# Patient Record
Sex: MALE | Race: OTHER | NOT HISPANIC OR LATINO
[De-identification: names, ages, dates, MRNs, and addresses within clinical notes are randomized per-mention and may not be internally consistent; named-entity substitution may affect disease eponyms.]

---

## 2020-03-14 ENCOUNTER — TRANSCRIPTION ENCOUNTER (OUTPATIENT)
Age: 25
End: 2020-03-14

## 2020-04-11 ENCOUNTER — TRANSCRIPTION ENCOUNTER (OUTPATIENT)
Age: 25
End: 2020-04-11

## 2020-05-01 ENCOUNTER — EMERGENCY (EMERGENCY)
Facility: HOSPITAL | Age: 25
LOS: 1 days | Discharge: HOME | End: 2020-05-01
Attending: EMERGENCY MEDICINE
Payer: COMMERCIAL

## 2020-05-01 VITALS
HEIGHT: 72 IN | TEMPERATURE: 98 F | OXYGEN SATURATION: 100 % | WEIGHT: 169.98 LBS | DIASTOLIC BLOOD PRESSURE: 72 MMHG | SYSTOLIC BLOOD PRESSURE: 135 MMHG | RESPIRATION RATE: 18 BRPM | HEART RATE: 77 BPM

## 2020-05-01 DIAGNOSIS — Z90.49 ACQUIRED ABSENCE OF OTHER SPECIFIED PARTS OF DIGESTIVE TRACT: Chronic | ICD-10-CM

## 2020-05-01 LAB
ALBUMIN SERPL ELPH-MCNC: 4.4 G/DL — SIGNIFICANT CHANGE UP (ref 3.5–5)
ALP SERPL-CCNC: 79 U/L — SIGNIFICANT CHANGE UP (ref 40–120)
ALT FLD-CCNC: 22 U/L DA — SIGNIFICANT CHANGE UP (ref 10–60)
ANION GAP SERPL CALC-SCNC: 7 MMOL/L — SIGNIFICANT CHANGE UP (ref 5–17)
AST SERPL-CCNC: 11 U/L — SIGNIFICANT CHANGE UP (ref 10–40)
BASOPHILS # BLD AUTO: 0.02 K/UL — SIGNIFICANT CHANGE UP (ref 0–0.2)
BASOPHILS NFR BLD AUTO: 0.3 % — SIGNIFICANT CHANGE UP (ref 0–2)
BILIRUB SERPL-MCNC: 1.2 MG/DL — SIGNIFICANT CHANGE UP (ref 0.2–1.2)
BUN SERPL-MCNC: 10 MG/DL — SIGNIFICANT CHANGE UP (ref 7–18)
CALCIUM SERPL-MCNC: 9.4 MG/DL — SIGNIFICANT CHANGE UP (ref 8.4–10.5)
CHLORIDE SERPL-SCNC: 103 MMOL/L — SIGNIFICANT CHANGE UP (ref 96–108)
CO2 SERPL-SCNC: 28 MMOL/L — SIGNIFICANT CHANGE UP (ref 22–31)
CREAT SERPL-MCNC: 1.02 MG/DL — SIGNIFICANT CHANGE UP (ref 0.5–1.3)
EOSINOPHIL # BLD AUTO: 0.11 K/UL — SIGNIFICANT CHANGE UP (ref 0–0.5)
EOSINOPHIL NFR BLD AUTO: 1.8 % — SIGNIFICANT CHANGE UP (ref 0–6)
GLUCOSE SERPL-MCNC: 128 MG/DL — HIGH (ref 70–99)
HCT VFR BLD CALC: 39.4 % — SIGNIFICANT CHANGE UP (ref 39–50)
HGB BLD-MCNC: 13.8 G/DL — SIGNIFICANT CHANGE UP (ref 13–17)
IMM GRANULOCYTES NFR BLD AUTO: 0.2 % — SIGNIFICANT CHANGE UP (ref 0–1.5)
LIDOCAIN IGE QN: 116 U/L — SIGNIFICANT CHANGE UP (ref 73–393)
LYMPHOCYTES # BLD AUTO: 2.1 K/UL — SIGNIFICANT CHANGE UP (ref 1–3.3)
LYMPHOCYTES # BLD AUTO: 34 % — SIGNIFICANT CHANGE UP (ref 13–44)
MCHC RBC-ENTMCNC: 30.3 PG — SIGNIFICANT CHANGE UP (ref 27–34)
MCHC RBC-ENTMCNC: 35 GM/DL — SIGNIFICANT CHANGE UP (ref 32–36)
MCV RBC AUTO: 86.6 FL — SIGNIFICANT CHANGE UP (ref 80–100)
MONOCYTES # BLD AUTO: 0.7 K/UL — SIGNIFICANT CHANGE UP (ref 0–0.9)
MONOCYTES NFR BLD AUTO: 11.3 % — SIGNIFICANT CHANGE UP (ref 2–14)
NEUTROPHILS # BLD AUTO: 3.24 K/UL — SIGNIFICANT CHANGE UP (ref 1.8–7.4)
NEUTROPHILS NFR BLD AUTO: 52.4 % — SIGNIFICANT CHANGE UP (ref 43–77)
NRBC # BLD: 0 /100 WBCS — SIGNIFICANT CHANGE UP (ref 0–0)
PLATELET # BLD AUTO: 195 K/UL — SIGNIFICANT CHANGE UP (ref 150–400)
POTASSIUM SERPL-MCNC: 4 MMOL/L — SIGNIFICANT CHANGE UP (ref 3.5–5.3)
POTASSIUM SERPL-SCNC: 4 MMOL/L — SIGNIFICANT CHANGE UP (ref 3.5–5.3)
PROT SERPL-MCNC: 8 G/DL — SIGNIFICANT CHANGE UP (ref 6–8.3)
RBC # BLD: 4.55 M/UL — SIGNIFICANT CHANGE UP (ref 4.2–5.8)
RBC # FLD: 11.9 % — SIGNIFICANT CHANGE UP (ref 10.3–14.5)
SARS-COV-2 RNA SPEC QL NAA+PROBE: DETECTED
SODIUM SERPL-SCNC: 138 MMOL/L — SIGNIFICANT CHANGE UP (ref 135–145)
TROPONIN I SERPL-MCNC: <0.015 NG/ML — SIGNIFICANT CHANGE UP (ref 0–0.04)
WBC # BLD: 6.18 K/UL — SIGNIFICANT CHANGE UP (ref 3.8–10.5)
WBC # FLD AUTO: 6.18 K/UL — SIGNIFICANT CHANGE UP (ref 3.8–10.5)

## 2020-05-01 PROCEDURE — 84484 ASSAY OF TROPONIN QUANT: CPT

## 2020-05-01 PROCEDURE — 71045 X-RAY EXAM CHEST 1 VIEW: CPT | Mod: 26

## 2020-05-01 PROCEDURE — 99283 EMERGENCY DEPT VISIT LOW MDM: CPT

## 2020-05-01 PROCEDURE — 93005 ELECTROCARDIOGRAM TRACING: CPT

## 2020-05-01 PROCEDURE — 36415 COLL VENOUS BLD VENIPUNCTURE: CPT

## 2020-05-01 PROCEDURE — 71045 X-RAY EXAM CHEST 1 VIEW: CPT

## 2020-05-01 PROCEDURE — 83690 ASSAY OF LIPASE: CPT

## 2020-05-01 PROCEDURE — 99283 EMERGENCY DEPT VISIT LOW MDM: CPT | Mod: 25

## 2020-05-01 PROCEDURE — 87635 SARS-COV-2 COVID-19 AMP PRB: CPT

## 2020-05-01 PROCEDURE — 85027 COMPLETE CBC AUTOMATED: CPT

## 2020-05-01 PROCEDURE — 80053 COMPREHEN METABOLIC PANEL: CPT

## 2020-05-01 RX ORDER — LIDOCAINE 4 G/100G
15 CREAM TOPICAL ONCE
Refills: 0 | Status: COMPLETED | OUTPATIENT
Start: 2020-05-01 | End: 2020-05-01

## 2020-05-01 RX ADMIN — LIDOCAINE 15 MILLILITER(S): 4 CREAM TOPICAL at 11:58

## 2020-05-01 RX ADMIN — Medication 15 MILLILITER(S): at 11:58

## 2020-05-01 NOTE — ED PROVIDER NOTE - PATIENT PORTAL LINK FT
You can access the FollowMyHealth Patient Portal offered by [facility] by registering at the following website: [email or web address]. By joining [employer]’s FollowMyHealth portal, you will also be able to view your health information using other applications (apps) compatible with our system.

## 2020-05-01 NOTE — ED PROVIDER NOTE - OBJECTIVE STATEMENT
Patient presenting with 2 week history of chest pain. States pain is a burning sensation that involves his stomach and back as well. Denies SOB, diaphoresis. States when he eats he notices nausea without emesis. He also reports mild diarrhea. States he suspects he had COVID 4 weeks ago due to exposure from his wife. No medical history or allergies.

## 2023-12-22 ENCOUNTER — EMERGENCY (EMERGENCY)
Facility: HOSPITAL | Age: 28
LOS: 1 days | Discharge: HOME | End: 2023-12-22
Admitting: EMERGENCY MEDICINE
Payer: COMMERCIAL

## 2023-12-22 VITALS
TEMPERATURE: 98 F | OXYGEN SATURATION: 100 % | DIASTOLIC BLOOD PRESSURE: 76 MMHG | SYSTOLIC BLOOD PRESSURE: 157 MMHG | RESPIRATION RATE: 18 BRPM | HEART RATE: 63 BPM

## 2023-12-22 DIAGNOSIS — Z90.49 ACQUIRED ABSENCE OF OTHER SPECIFIED PARTS OF DIGESTIVE TRACT: Chronic | ICD-10-CM

## 2023-12-22 PROCEDURE — 99284 EMERGENCY DEPT VISIT MOD MDM: CPT

## 2023-12-22 RX ORDER — CYCLOBENZAPRINE HYDROCHLORIDE 10 MG/1
1 TABLET, FILM COATED ORAL
Qty: 12 | Refills: 0
Start: 2023-12-22 | End: 2023-12-25

## 2023-12-22 RX ORDER — KETOROLAC TROMETHAMINE 30 MG/ML
30 SYRINGE (ML) INJECTION ONCE
Refills: 0 | Status: DISCONTINUED | OUTPATIENT
Start: 2023-12-22 | End: 2023-12-22

## 2023-12-22 RX ORDER — CYCLOBENZAPRINE HYDROCHLORIDE 10 MG/1
5 TABLET, FILM COATED ORAL ONCE
Refills: 0 | Status: COMPLETED | OUTPATIENT
Start: 2023-12-22 | End: 2023-12-22

## 2023-12-22 RX ORDER — IBUPROFEN 200 MG
1 TABLET ORAL
Qty: 15 | Refills: 0
Start: 2023-12-22 | End: 2023-12-26

## 2023-12-22 RX ADMIN — Medication 30 MILLIGRAM(S): at 17:48

## 2023-12-22 RX ADMIN — CYCLOBENZAPRINE HYDROCHLORIDE 5 MILLIGRAM(S): 10 TABLET, FILM COATED ORAL at 17:48

## 2023-12-22 NOTE — ED PROVIDER NOTE - PROGRESS NOTE DETAILS
RUBEN Aldana: I had a detailed discussion with the patient and/or guardian regarding the historical points, exam findings, and any diagnostic results supporting the discharge diagnosis. Results endorsed including unexpected incidental findings (copy of reports provided to patient).   Pt educated on care and need for follow up. Strict return instructions and red flag signs and symptoms discussed with patient. Questions answered. Pt shows understanding of discharge information and agrees to follow. Pt medically stable for discharge.  Strict return precautions given.  Pt to follow up with PMD and ortho spine.

## 2023-12-22 NOTE — ED PROVIDER NOTE - CPE EDP EYES NORM
PAST SURGICAL HISTORY:  S/P CABG (coronary artery bypass graft)     Status post biventricular pacemaker      normal...

## 2023-12-22 NOTE — ED PROVIDER NOTE - PATIENT PORTAL LINK FT
You can access the FollowMyHealth Patient Portal offered by [facility] by registering at the following website: [email or web address]. By joining [employer]’s FollowMyHealth portal, you will also be able to view your health information using other applications (apps) compatible with our system. You can access the FollowMyHealth Patient Portal offered by [facility] by registering at the following website: [email or web address]. By joining [employer]’s FollowMyHealth portal, you will also be able to view your health information using other applications (apps) compatible with our system.

## 2023-12-22 NOTE — ED PROVIDER NOTE - MUSCULOSKELETAL NECK EXAM
+right sided paraspinal muscle and trapezius muscle spasm; no midline tenderness; no nuchal rigidity/PAIN ON MOVEMENT

## 2023-12-22 NOTE — ED ADULT TRIAGE NOTE - CHIEF COMPLAINT QUOTE
Pt c/o R neck pain, down R arm x 1 day. States pain is progressively worsening. Denies fevers/chills, numbness/tingling, dizziness, weakness. Pain to R side of head. No known trauma/injury. Denies PMHx

## 2023-12-22 NOTE — ED PROVIDER NOTE - CLINICAL SUMMARY MEDICAL DECISION MAKING FREE TEXT BOX
Patient is a 28-year-old male with past medical history of cholecystectomy presents with right neck pain x 3 days.  Patient reports intermittent right-sided neck pain that is triggered by turning head left or right.  Patient reports pain at times radiates to right occipital aspect and to her right deltoid.  Patient reports pain only occurs upon turning head.  Improves with keeping head still.  Denies any fevers, chills, changes in vision or hearing, dizziness, lightheaded, numbness, weakness, head trauma, falls, chest pain, shortness of breath, cough, illicit drug use, alcohol abuse, history of malignancy.  This is a patient with likely musculoskeletal neck pain, possible cervical radiculopathy.  Very low clinical suspicion for disease processes including but not limited to intracranial hemorrhage, spinal fracture, spinal cord compression.  Plan to order pain medication and discharge with outpatient primary care and orthopedics follow-up.

## 2023-12-22 NOTE — ED PROVIDER NOTE - NSFOLLOWUPINSTRUCTIONS_ED_ALL_ED_FT
Advance activity as tolerated.  Continue all previously prescribed medications as directed unless otherwise instructed.  Take Motrin 600 mg every 8 hours as needed for moderate pain or fevers -- take with food.  Take Tylenol 650mg (Two 325 mg pills) every 4-6 hours as needed for pain or fevers.  Take Flexeril 5 mg every 8 hours as needed for muscle spasm -- causes drowsiness; no drinking alcohol or driving with this medication.  Follow up with your primary care physician and orthopedics (referral list provided or call [phone] to make an appointment with the orthopedics clinic) in 48-72 hours- bring copies of your results.  Return to the ER for worsening or persistent symptoms, including but not limited to worsening/persistent pain, numbness, weakness, difficulty urinating, difficulty passing bowel movements, incontinence, difficulty walking, falls, abdominal pain, chest pain, fevers, and/or ANY NEW OR CONCERNING SYMPTOMS. If you have issues obtaining follow up, please call: [phone] to obtain a doctor or specialist who takes your insurance in your area.  You may call [phone] to make an appointment with the internal medicine clinic.      NECK PAIN - Discharge Care    Neck Pain    WHAT YOU NEED TO KNOW:    You may have sudden neck pain that increases quickly. You may instead feel pain build slowly over time. Neck pain may go away in a few days or weeks, or it may continue for months. The pain may come and go, or be worse with certain movements. The pain may be only in your neck, or it may move to your arms, back, or shoulders. You may also have pain that starts in another body area and moves to your neck. Some types of neck pain are permanent.  Vertebral Column    DISCHARGE INSTRUCTIONS:    Seek care immediately if:    You have an injury that causes neck pain and shooting pain down your arms or legs.    Your neck pain suddenly becomes severe.    You have neck pain along with numbness, tingling, or weakness in your arms or legs.    You have a stiff neck, a headache, and a fever.  Contact your healthcare provider if:    You have new or worsening symptoms.    Your symptoms continue even after treatment.    You have questions or concerns about your condition or care.  Medicines: You may need any of the following:    NSAIDs, such as ibuprofen, help decrease swelling, pain, and fever. This medicine is available without a doctor's order. Ask your healthcare provider which medicine to take and how often to take it. Follow directions. NSAIDs can cause stomach bleeding or kidney problems if not taken correctly. If you take blood thinner medicine, always ask if NSAIDs are safe for you.    Acetaminophen helps decrease pain and fever. Ask your healthcare provider how much to take and how often to take it. Follow directions. Acetaminophen can cause liver damage if not taken correctly.    Steroid medicine may be used to reduce inflammation. This can help relieve pain caused by swelling.    Take your medicine as directed. Contact your healthcare provider if you think your medicine is not helping or if you have side effects. Tell your provider if you are allergic to any medicine. Keep a list of the medicines, vitamins, and herbs you take. Include the amounts, and when and why you take them. Bring the list or the pill bottles to follow-up visits. Carry your medicine list with you in case of an emergency.  Manage or prevent neck pain:    Rest your neck as directed. Do not make sudden movements, such as turning your head quickly. Your healthcare provider may recommend you wear a cervical collar for a short time. The collar will prevent you from moving your head. This will give your neck time to heal if an injury is causing your neck pain. Ask your provider when you can return to sports or other normal daily activities.    Apply heat as directed. Heat helps relieve pain and swelling. Use a heat wrap, or soak a small towel in warm water. Wring out the extra water. Apply the heat wrap or towel for 20 minutes every hour, or as directed.    Apply ice as directed. Ice helps relieve pain and swelling, and can help prevent tissue damage. Use an ice pack, or put ice in a bag. Cover the ice pack or back with a towel before you apply it to your neck. Apply the ice pack or ice for 15 minutes every hour, or as directed. Your provider can tell you how often to apply ice.    Do neck exercises as directed. Neck exercises help strengthen the muscles and increase range of motion. Your provider will tell you which exercises are right for you. The provider may give you instructions or may recommend that you work with a physical therapist. Your provider or therapist can make sure you are doing the exercises correctly.    Maintain good posture. Try to keep your head and shoulders lifted when you sit. If you work in front of a computer, make sure the monitor is at the right level. You should not need to look up down to see the screen. You should also not have to lean forward to be able to read what is on the screen. Make sure your keyboard, mouse, and other computer items are placed where you do not have to extend your shoulder to reach them. Get up often if you work in front of a computer or sit for long periods of time. Stretch or walk around to keep your neck muscles loose.  [employer]  Follow up with your healthcare provider as directed: Your healthcare provider may refer you to a specialist if your pain does not get better with treatment. Write down your questions so you remember to ask them during your visits.    © [employer] 1973, 2023 Advance activity as tolerated.  Continue all previously prescribed medications as directed unless otherwise instructed.  Take Motrin 600 mg every 8 hours as needed for moderate pain or fevers -- take with food.  Take Tylenol 650mg (Two 325 mg pills) every 4-6 hours as needed for pain or fevers.  Take Flexeril 5 mg every 8 hours as needed for muscle spasm -- causes drowsiness; no drinking alcohol or driving with this medication.  Follow up with your primary care physician and orthopedics (referral list provided or call [phone] to make an appointment with the orthopedics clinic) in 48-72 hours- bring copies of your results.  Return to the ER for worsening or persistent symptoms, including but not limited to worsening/persistent pain, numbness, weakness, difficulty urinating, difficulty passing bowel movements, incontinence, difficulty walking, falls, abdominal pain, chest pain, fevers, and/or ANY NEW OR CONCERNING SYMPTOMS. If you have issues obtaining follow up, please call: [phone] to obtain a doctor or specialist who takes your insurance in your area.  You may call [phone] to make an appointment with the internal medicine clinic.      NECK PAIN - Discharge Care    Neck Pain    WHAT YOU NEED TO KNOW:    You may have sudden neck pain that increases quickly. You may instead feel pain build slowly over time. Neck pain may go away in a few days or weeks, or it may continue for months. The pain may come and go, or be worse with certain movements. The pain may be only in your neck, or it may move to your arms, back, or shoulders. You may also have pain that starts in another body area and moves to your neck. Some types of neck pain are permanent.  Vertebral Column    DISCHARGE INSTRUCTIONS:    Seek care immediately if:    You have an injury that causes neck pain and shooting pain down your arms or legs.    Your neck pain suddenly becomes severe.    You have neck pain along with numbness, tingling, or weakness in your arms or legs.    You have a stiff neck, a headache, and a fever.  Contact your healthcare provider if:    You have new or worsening symptoms.    Your symptoms continue even after treatment.    You have questions or concerns about your condition or care.  Medicines: You may need any of the following:    NSAIDs, such as ibuprofen, help decrease swelling, pain, and fever. This medicine is available without a doctor's order. Ask your healthcare provider which medicine to take and how often to take it. Follow directions. NSAIDs can cause stomach bleeding or kidney problems if not taken correctly. If you take blood thinner medicine, always ask if NSAIDs are safe for you.    Acetaminophen helps decrease pain and fever. Ask your healthcare provider how much to take and how often to take it. Follow directions. Acetaminophen can cause liver damage if not taken correctly.    Steroid medicine may be used to reduce inflammation. This can help relieve pain caused by swelling.    Take your medicine as directed. Contact your healthcare provider if you think your medicine is not helping or if you have side effects. Tell your provider if you are allergic to any medicine. Keep a list of the medicines, vitamins, and herbs you take. Include the amounts, and when and why you take them. Bring the list or the pill bottles to follow-up visits. Carry your medicine list with you in case of an emergency.  Manage or prevent neck pain:    Rest your neck as directed. Do not make sudden movements, such as turning your head quickly. Your healthcare provider may recommend you wear a cervical collar for a short time. The collar will prevent you from moving your head. This will give your neck time to heal if an injury is causing your neck pain. Ask your provider when you can return to sports or other normal daily activities.    Apply heat as directed. Heat helps relieve pain and swelling. Use a heat wrap, or soak a small towel in warm water. Wring out the extra water. Apply the heat wrap or towel for 20 minutes every hour, or as directed.    Apply ice as directed. Ice helps relieve pain and swelling, and can help prevent tissue damage. Use an ice pack, or put ice in a bag. Cover the ice pack or back with a towel before you apply it to your neck. Apply the ice pack or ice for 15 minutes every hour, or as directed. Your provider can tell you how often to apply ice.    Do neck exercises as directed. Neck exercises help strengthen the muscles and increase range of motion. Your provider will tell you which exercises are right for you. The provider may give you instructions or may recommend that you work with a physical therapist. Your provider or therapist can make sure you are doing the exercises correctly.    Maintain good posture. Try to keep your head and shoulders lifted when you sit. If you work in front of a computer, make sure the monitor is at the right level. You should not need to look up down to see the screen. You should also not have to lean forward to be able to read what is on the screen. Make sure your keyboard, mouse, and other computer items are placed where you do not have to extend your shoulder to reach them. Get up often if you work in front of a computer or sit for long periods of time. Stretch or walk around to keep your neck muscles loose.  [employer]  Follow up with your healthcare provider as directed: Your healthcare provider may refer you to a specialist if your pain does not get better with treatment. Write down your questions so you remember to ask them during your visits.    © [employer] 1973, 2023

## 2023-12-22 NOTE — ED ADULT NURSE NOTE - OBJECTIVE STATEMENT
presents to wellness c/o right neck pain began 2 days ago worsening today denies any trauma photophobia HA fever/chills. medicated per order.

## 2023-12-22 NOTE — ED PROVIDER NOTE - OBJECTIVE STATEMENT
Patient is a 28-year-old male with past medical history of cholecystectomy presents with right neck pain x 3 days.  Patient reports intermittent right-sided neck pain that is triggered by turning head left or right.  Patient reports pain at times radiates to right occipital aspect and to her right deltoid.  Patient reports pain only occurs upon turning head.  Improves with keeping head still.  Denies any fevers, chills, changes in vision or hearing, dizziness, lightheaded, numbness, weakness, head trauma, falls, chest pain, shortness of breath, cough, illicit drug use, alcohol abuse, history of malignancy.

## 2023-12-22 NOTE — ED ADULT NURSE NOTE - NSFALLUNIVINTERV_ED_ALL_ED
Bed/Stretcher in lowest position, wheels locked, appropriate side rails in place/Call bell, personal items and telephone in reach/Instruct patient to call for assistance before getting out of bed/chair/stretcher/Non-slip footwear applied when patient is off stretcher/[place] to call system/Physically safe environment - no spills, clutter or unnecessary equipment/Purposeful proactive rounding/Room/bathroom lighting operational, light cord in reach Bed/Stretcher in lowest position, wheels locked, appropriate side rails in place/Call bell, personal items and telephone in reach/Instruct patient to call for assistance before getting out of bed/chair/stretcher/Non-slip footwear applied when patient is off stretcher/[place] to call system/Physically safe environment - no spills, clutter or unnecessary equipment/Purposeful proactive rounding/Room/bathroom lighting operational, light cord in reach